# Patient Record
Sex: MALE | Race: WHITE | NOT HISPANIC OR LATINO | URBAN - METROPOLITAN AREA
[De-identification: names, ages, dates, MRNs, and addresses within clinical notes are randomized per-mention and may not be internally consistent; named-entity substitution may affect disease eponyms.]

---

## 2018-04-23 ENCOUNTER — ALLIED HEALTH/NURSE VISIT (OUTPATIENT)
Dept: NURSING | Facility: CLINIC | Age: 83
End: 2018-04-23
Payer: MEDICARE

## 2018-04-23 VITALS — SYSTOLIC BLOOD PRESSURE: 134 MMHG | DIASTOLIC BLOOD PRESSURE: 70 MMHG | HEART RATE: 80 BPM

## 2018-04-23 DIAGNOSIS — S09.90XA HEAD INJURY: Primary | ICD-10-CM

## 2018-04-23 PROCEDURE — 99207 ZZC NO CHARGE NURSE ONLY: CPT

## 2018-04-23 NOTE — MR AVS SNAPSHOT
"              After Visit Summary   2018    Tre Ludwig    MRN: [id number]           Patient Information     Date Of Birth          1930        Visit Information        Provider Department      2018 10:30 AM HP RN/TRIAGE NURSE ONLY [facility]        Today's Diagnoses     Head injury    -  1       Follow-ups after your visit        Who to contact     If you have questions or need follow up information about today's clinic visit or your schedule please contact [facility] directly at [phone].  Normal or non-critical lab and imaging results will be communicated to you by Netologyhart, letter or phone within 4 business days after the clinic has received the results. If you do not hear from us within 7 days, please contact the clinic through Camiloot or phone. If you have a critical or abnormal lab result, we will notify you by phone as soon as possible.  Submit refill requests through SimpleCrew or call your pharmacy and they will forward the refill request to us. Please allow 3 business days for your refill to be completed.          Additional Information About Your Visit        MyChart Information     SimpleCrew lets you send messages to your doctor, view your test results, renew your prescriptions, schedule appointments and more. To sign up, go to [email or web address] . Click on \"Log in\" on the left side of the screen, which will take you to the Welcome page. Then click on \"Sign up Now\" on the right side of the page.     You will be asked to enter the access code listed below, as well as some personal information. Please follow the directions to create your username and password.     Your access code is: [email or web address]  Expires: 2018 11:15 AM     Your access code will  in 90 days. If you need help or a new code, please call your [facility] or [phone].        Care EveryWhere ID     This is your Care EveryWhere ID. This could be used by other " organizations to access your [facility] medical records  [id number]        Your Vitals Were     Pulse                   80            Blood Pressure from Last 3 Encounters:   04/23/18 134/70    Weight from Last 3 Encounters:   No data found for Wt              Today, you had the following     No orders found for display       Primary Care Provider Office Phone # Fax #    Andre Yaya Lang [phone]       [facility] [address]  [facility] [address]        Equal Access to Services     SHAYE VARELA : Hadii aad ku hadasho Soomaali, waaxda luqadaha, qaybta kaalmada adeegyada, waxay idiin hayaan adeeg kharash lashanae . So [facility] [phone].    ATENCIÓN: Si habla español, tiene a thorpe disposición servicios gratuitos de asistencia lingüística. [facility] [phone].    We comply with applicable federal civil rights laws and [place] laws. We do not discriminate on the basis of race, color, national origin, age, disability, sex, sexual orientation, or gender identity.            Thank you!     Thank you for choosing [facility]  for your care. Our goal is always to provide you with excellent care. Hearing back from our patients is one way we can continue to improve our services. Please take a few minutes to complete the written survey that you may receive in the mail after your visit with us. Thank you!             Your Updated Medication List - Protect others around you: Learn how to safely use, store and throw away your medicines at www.disposemymeds.org.      Notice  As of 4/23/2018 11:15 AM    You have not been prescribed any medications.

## 2018-04-23 NOTE — NURSING NOTE
Tre Ludwig is a 87 year old male who presents with head injury.    NURSING ASSESSMENT:  Description:  Patient fell today getting out of bed due to walker folding on him - hit head on marble nightstand next to bed - present today with wife and another person who stayed outside of room - he has never been seen here before- his primary is a private practice provider through but partners with [facility] and he was recommended by his office to walk into our clinic    Onset/duration:  4/23/2018  Precip. factors:  fall  Associated symptoms:    1. Head injury - small pinpoint laceration above right eye - denies loss of consciousness - PERRL, good strenght upper/lower extremities bilaterally - patient is A & O x 3 - vitals look within normal limits - wife states blood pressure is usually slightly lower  2. Left knee injury - small scrape was bleeding - appears to have stopped at this time  3. Med list:  Plavix, aspirin  4. Patient was able to ambulate into office with walker       Last exam/Treatment:  Has not been seen at our [facility]  Allergies: Allergies not on file    MEDICATIONS:   Taking medication(s) as prescribed? Yes    NURSING PLAN: Nursing advice to patient due to age, head injury, blood thinners, patient not seen within [facility], no appointments in clinic, no imaging - discussed these dynamics with patient and wife - Emergency room is recommended - they verbalized understanding and agree with plan    RECOMMENDED DISPOSITION:  To ED, another person to drive - see above  Will comply with recommendation: Yes  If further questions/concerns or if symptoms do not improve, worsen or new symptoms develop, call your PCP or [facility] as soon as possible.      Guideline used:  Telephone Triage Protocols for Nurses, Fifth Edition, Cristela Waters RN

## 2018-05-11 ENCOUNTER — RECORDS - HEALTHEAST (OUTPATIENT)
Dept: LAB | Facility: CLINIC | Age: 83
End: 2018-05-11

## 2018-05-14 LAB
BACTERIA SPEC CULT: ABNORMAL

## 2021-05-25 ENCOUNTER — RECORDS - HEALTHEAST (OUTPATIENT)
Dept: ADMINISTRATIVE | Facility: CLINIC | Age: 86
End: 2021-05-25

## 2021-05-29 ENCOUNTER — RECORDS - HEALTHEAST (OUTPATIENT)
Dept: ADMINISTRATIVE | Facility: CLINIC | Age: 86
End: 2021-05-29

## 2021-05-30 ENCOUNTER — RECORDS - HEALTHEAST (OUTPATIENT)
Dept: ADMINISTRATIVE | Facility: CLINIC | Age: 86
End: 2021-05-30

## 2021-05-31 ENCOUNTER — RECORDS - HEALTHEAST (OUTPATIENT)
Dept: ADMINISTRATIVE | Facility: CLINIC | Age: 86
End: 2021-05-31